# Patient Record
Sex: MALE
[De-identification: names, ages, dates, MRNs, and addresses within clinical notes are randomized per-mention and may not be internally consistent; named-entity substitution may affect disease eponyms.]

---

## 2019-02-06 PROBLEM — M25.571 CHRONIC PAIN OF RIGHT ANKLE: Status: ACTIVE | Noted: 2019-02-06

## 2019-02-13 ENCOUNTER — APPOINTMENT (OUTPATIENT)
Dept: ORTHOPEDIC SURGERY | Facility: CLINIC | Age: 59
End: 2019-02-13
Payer: COMMERCIAL

## 2019-02-13 VITALS
BODY MASS INDEX: 29.55 KG/M2 | HEART RATE: 87 BPM | SYSTOLIC BLOOD PRESSURE: 127 MMHG | WEIGHT: 195 LBS | DIASTOLIC BLOOD PRESSURE: 63 MMHG | HEIGHT: 68 IN

## 2019-02-13 DIAGNOSIS — Z78.9 OTHER SPECIFIED HEALTH STATUS: ICD-10-CM

## 2019-02-13 DIAGNOSIS — M25.571 PAIN IN RIGHT ANKLE AND JOINTS OF RIGHT FOOT: ICD-10-CM

## 2019-02-13 DIAGNOSIS — M76.61 ACHILLES TENDINITIS, RIGHT LEG: ICD-10-CM

## 2019-02-13 DIAGNOSIS — G89.29 PAIN IN RIGHT ANKLE AND JOINTS OF RIGHT FOOT: ICD-10-CM

## 2019-02-13 DIAGNOSIS — M65.28 CALCIFIC TENDINITIS, OTHER SITE: ICD-10-CM

## 2019-02-13 PROCEDURE — 73610 X-RAY EXAM OF ANKLE: CPT | Mod: RT

## 2019-02-13 PROCEDURE — 99244 OFF/OP CNSLTJ NEW/EST MOD 40: CPT

## 2019-02-13 RX ORDER — DICLOFENAC SODIUM 50 MG/1
50 TABLET, DELAYED RELEASE ORAL
Qty: 60 | Refills: 1 | Status: ACTIVE | COMMUNITY
Start: 2019-02-13 | End: 1900-01-01

## 2019-02-13 NOTE — HISTORY OF PRESENT ILLNESS
[de-identified] : CC right ankle\par \par HPI 59 yo male presents with chronic onset 3 years of constant pain in the exterior right ankle[without injury]. The pain is same, and rated a 8 out of 10, described as pain, [without radiation]. Nothing makes the pain better and any activity makes the pain worse. The patient reports associated symptoms of pain. The patient + similar pain previously . \par \par The patient has tried the following treatments:\par Activity modification	+\par Ice/Compression  	+\par Braces    		-\par Nsaids    		+\par Physical Therapy  	+\par Cortisone Injection	-\par Arthroscopy		-\par \par \par Review of Systems is positive for the above musculoskeletal symptoms and is otherwise non-contributory for general, constitutional, psychiatric, neurologic, HEENT, cardiac, respiratory, gastrointestinal, reproductive, lymphatic, and dermatologic complaints.\par \par Consult by Dr Jimenez\par

## 2019-02-13 NOTE — DISCUSSION/SUMMARY
[de-identified] : Right Achilles insertional tendinitis\par \par Discussed the findings and history exam and radiology\par \par Physical therapy\par \par Ice or heat\par \par The patient was prescribed Diclofenac PO non-steroidal anti-inflammatory medication. 50mg tablets twice daily to be taken for at least 1-2 weeks in a row and then PRN afterwards. Risks and benefits were discussed and include but not limited to renal damage and GI ulceration and bleeding.  They were advised to take with food to limit stomach upset as well as warned to stop the medication if worsening gastric pain or dizziness or other side effects. Also to immediately stop the medication and seek appropriate medical attention if any severe stomach ache, gastritis, black/red vomit, black/red stools or any other medical concern.\par \par \par CAM boot Immobilization for 6 weeks\par \par Follow up after 6 weeks for evaluation

## 2019-02-13 NOTE — PHYSICAL EXAM
[de-identified] : Physical Examination\par General: well nourished, in no acute distress, alert and oriented to person, place and time\par Psychiatric: normal mood and affect, no abnormal movements or speech patterns\par Eyes: vision intact - glasses\par Throat: no thyromegaly\par Lymph: no enlarged nodes, no lymphedema in extremity\par Respiratory: no wheezing, no shortness of breath with ambulation\par Cardiac: no cardiac leg swelling, 2+ peripheral pulses\par Neurology: normal gross sensation in extremities to light touch\par Abdomen: soft, non-tender, tympanic, no masses\par \par Musculoskeletal Examination\par Ambulation	+ antalgic gait, - assistive devices\par \par Ankle			Right			Left\par General\par 	Deformity       	normal			normal	\par 	Skin/Edema   	normal			normal\par 	Erythema       	-			-\par 	Alignment      	neutral			neutral\par Range of Motion\par 	Ankle Dorsiflex	20			20\par 	Ankle Plantarflex	45			45\par 	Inversion        	15			15\par 	Eversion		5			5\par Drawer\par 	ATFL		-			-\par 	CFL	                	-			-\par 	PTFL		-			-\par Palpation\par 	ATFL		-			-\par 	Medial Heel   	-			-\par 	Other		achilles tuberosity, no tenderness along tendon or thickening noted			-\par Strength Examination/Atrophy\par 	EHL 		5+			5+\par 	FHL 		5+			5+\par 	Tibialis Anterior	5+			5+\par 	Achilles/Soleus	5+			5+\par Sensation\par 	Deep Peroneal	normal			normal\par 	Super Peroneal 	normal			normal\par 	Sural 		normal			normal\par 	Posterior Tibial 	normal			normal\par 	Saphenous    	normal			normal\par Pulses\par 	DP   		2+			2+\par \par \par  [de-identified] : 3 views of the affected right ankle, (AP, Oblique and Lateral)\par were ordered, obtained and evaluated by myself today and\par demonstrate:\par [Is] intact Mortise\par [Normal] Talus contour [without] cysts\par - osteophtes\par - Os Trigonum\par + Large Achilles and plantar fascia spurring evaluation limited by technique\par [No] soft tissue calcification

## 2022-04-20 ENCOUNTER — TRANSCRIPTION ENCOUNTER (OUTPATIENT)
Age: 62
End: 2022-04-20

## 2025-07-12 ENCOUNTER — NON-APPOINTMENT (OUTPATIENT)
Age: 65
End: 2025-07-12

## 2025-07-14 ENCOUNTER — APPOINTMENT (OUTPATIENT)
Age: 65
End: 2025-07-14
Payer: COMMERCIAL

## 2025-07-14 VITALS
DIASTOLIC BLOOD PRESSURE: 77 MMHG | WEIGHT: 294 LBS | OXYGEN SATURATION: 95 % | HEART RATE: 75 BPM | SYSTOLIC BLOOD PRESSURE: 123 MMHG | HEIGHT: 68 IN | BODY MASS INDEX: 44.56 KG/M2

## 2025-07-14 PROBLEM — M75.02 ADHESIVE CAPSULITIS OF LEFT SHOULDER: Status: ACTIVE | Noted: 2025-07-14

## 2025-07-14 PROCEDURE — 73080 X-RAY EXAM OF ELBOW: CPT | Mod: LT

## 2025-07-14 PROCEDURE — 99203 OFFICE O/P NEW LOW 30 MIN: CPT

## 2025-07-14 PROCEDURE — 73030 X-RAY EXAM OF SHOULDER: CPT | Mod: LT

## 2025-07-14 RX ORDER — DICLOFENAC SODIUM 75 MG/1
75 TABLET, DELAYED RELEASE ORAL TWICE DAILY
Qty: 30 | Refills: 1 | Status: ACTIVE | COMMUNITY
Start: 2025-07-14 | End: 1900-01-01

## 2025-09-18 ENCOUNTER — APPOINTMENT (OUTPATIENT)
Age: 65
End: 2025-09-18